# Patient Record
Sex: FEMALE | Race: ASIAN | NOT HISPANIC OR LATINO | Employment: UNEMPLOYED | URBAN - METROPOLITAN AREA
[De-identification: names, ages, dates, MRNs, and addresses within clinical notes are randomized per-mention and may not be internally consistent; named-entity substitution may affect disease eponyms.]

---

## 2019-04-09 PROBLEM — L68.0 HIRSUTISM: Status: ACTIVE | Noted: 2019-04-09

## 2019-04-09 PROBLEM — J01.00 ACUTE NON-RECURRENT MAXILLARY SINUSITIS: Status: ACTIVE | Noted: 2019-04-09

## 2019-10-21 PROBLEM — M26.621 TENDERNESS OF RIGHT TEMPOROMANDIBULAR JOINT: Status: ACTIVE | Noted: 2019-10-21

## 2019-10-21 PROBLEM — H92.01 RIGHT EAR PAIN: Status: ACTIVE | Noted: 2019-10-21

## 2019-10-21 PROBLEM — H92.01 RIGHT EAR PAIN: Status: ACTIVE | Noted: 2019-10-18

## 2020-05-11 PROBLEM — Z00.00 ROUTINE MEDICAL EXAM: Status: ACTIVE | Noted: 2020-05-11

## 2020-05-11 PROBLEM — Z00.01 ENCOUNTER FOR GENERAL ADULT MEDICAL EXAMINATION WITH ABNORMAL FINDINGS: Status: ACTIVE | Noted: 2020-05-11

## 2021-07-09 PROBLEM — E78.2 MIXED HYPERLIPIDEMIA: Status: ACTIVE | Noted: 2021-07-09

## 2021-07-09 PROBLEM — Z00.00 ROUTINE MEDICAL EXAM: Status: RESOLVED | Noted: 2020-05-11 | Resolved: 2021-07-09

## 2021-07-09 PROBLEM — J01.00 ACUTE NON-RECURRENT MAXILLARY SINUSITIS: Status: RESOLVED | Noted: 2019-04-09 | Resolved: 2021-07-09

## 2022-09-13 ENCOUNTER — OFFICE VISIT (OUTPATIENT)
Dept: FAMILY MEDICINE CLINIC | Facility: CLINIC | Age: 42
End: 2022-09-13
Payer: COMMERCIAL

## 2022-09-13 VITALS
WEIGHT: 139 LBS | DIASTOLIC BLOOD PRESSURE: 60 MMHG | HEIGHT: 62 IN | SYSTOLIC BLOOD PRESSURE: 100 MMHG | HEART RATE: 84 BPM | BODY MASS INDEX: 25.58 KG/M2 | OXYGEN SATURATION: 98 % | TEMPERATURE: 98.8 F

## 2022-09-13 DIAGNOSIS — E78.2 MIXED HYPERLIPIDEMIA: ICD-10-CM

## 2022-09-13 DIAGNOSIS — N89.8 VAGINAL ITCHING: ICD-10-CM

## 2022-09-13 DIAGNOSIS — E55.9 VITAMIN D DEFICIENCY: ICD-10-CM

## 2022-09-13 DIAGNOSIS — E66.3 OVERWEIGHT WITH BODY MASS INDEX (BMI) OF 25 TO 25.9 IN ADULT: ICD-10-CM

## 2022-09-13 DIAGNOSIS — Z12.31 ENCOUNTER FOR SCREENING MAMMOGRAM FOR BREAST CANCER: ICD-10-CM

## 2022-09-13 DIAGNOSIS — Z00.01 ENCOUNTER FOR WELL ADULT EXAM WITH ABNORMAL FINDINGS: Primary | ICD-10-CM

## 2022-09-13 LAB
BV WHIFF TEST VAG QL: NORMAL
CLUE CELLS SPEC QL WET PREP: NORMAL
PH SMN: NORMAL [PH]
SL AMB POCT WET MOUNT: NEGATIVE
T VAGINALIS VAG QL WET PREP: NORMAL
YEAST VAG QL WET PREP: NEGATIVE

## 2022-09-13 PROCEDURE — 99396 PREV VISIT EST AGE 40-64: CPT | Performed by: FAMILY MEDICINE

## 2022-09-13 PROCEDURE — 3725F SCREEN DEPRESSION PERFORMED: CPT | Performed by: FAMILY MEDICINE

## 2022-09-13 PROCEDURE — 99214 OFFICE O/P EST MOD 30 MIN: CPT | Performed by: FAMILY MEDICINE

## 2022-09-13 PROCEDURE — 87210 SMEAR WET MOUNT SALINE/INK: CPT | Performed by: FAMILY MEDICINE

## 2022-09-13 NOTE — PROGRESS NOTES
ADULT ANNUAL [address] [facility] PRIMARY CARE [facility]    NAME: Wyatt Carmen  AGE: 36 y o  SEX: female  : 1980     DATE: 2022     Assessment and Plan:     Problem List Items Addressed This Visit        Musculoskeletal and Integument    Vaginal itching     A new diagnosis symptomatic vaginal history exam no discharge wet mount negative the an no yeast infection result discussed the patient most probably irritation secondary to change in the brand the of the pad she use at the time of the menstruation recommend to use and scented 1 and use cotton underwear and the avoid the tight clothes         Relevant Orders    POCT wet mount (Completed)       Other    Vitamin D deficiency     Chronic in the vitamin-D compared with before continue vitamin-D supplement         Encounter for well adult exam with abnormal findings - Primary     Advice and education were given regarding nutrition, aerobic exercises, weight bearing exercises, cardiovascular risk reduction, fall risk reduction, and age appropriate supplements  The patient was counseled regarding instructions for management, risk factor reductions, prognosis, risks and benefits of treatment options, patient and family education, and importance of compliance with treatment       The patient is due for mammogram in  will schedule         Mixed hyperlipidemia     A chronic asymptomatic uncontrolled a 1 compare lipid panel to previously increase compared with before encouraged patient to watch for the portion low-fat diet and increased physical activity         Relevant Orders    Lipid Panel with Direct LDL reflex    Overweight with body mass index (BMI) of 25 to 25 9 in adult     The BMI today 25 63 discussed the patient portion control low carb low-fat diet and increased physical activity           Other Visit Diagnoses     Encounter for screening mammogram for breast cancer        Relevant Orders    Mammo screening bilateral w 3d & cad          Immunizations and preventive care screenings were discussed with patient today  Appropriate education was printed on patient's after visit summary  Counseling:  Alcohol/drug use: discussed moderation in alcohol intake, the recommendations for healthy alcohol use, and avoidance of illicit drug use  Dental Health: discussed importance of regular tooth brushing, flossing, and dental visits  Injury prevention: discussed safety/seat belts, safety helmets, smoke detectors, carbon dioxide detectors, and smoking near bedding or upholstery  Sexual health: discussed sexually transmitted diseases, partner selection, use of condoms, avoidance of unintended pregnancy, and contraceptive alternatives  Exercise: the importance of regular exercise/physical activity was discussed  Recommend exercise 3-5 times per week for at least 30 minutes  Depression Screening and Follow-up Plan: Patient was screened for depression during today's encounter  They screened negative with a PHQ-2 score of 0  Return in about 1 year (around 9/13/2023)  Chief Complaint:     Chief Complaint   Patient presents with    Physical Exam      History of Present Illness:     Adult Annual Physical   Patient here for a comprehensive physical exam  The patient reports problems - Vaginal itchy  Diet and Physical Activity  Diet/Nutrition: No special diet  Exercise: no formal exercise  Depression Screening  PHQ-2/9 Depression Screening    Little interest or pleasure in doing things: 0 - not at all  Feeling down, depressed, or hopeless: 0 - not at all  PHQ-2 Score: 0  PHQ-2 Interpretation: Negative depression screen       General Health  Sleep: sleeps well  Hearing: normal - bilateral   Vision: wears glasses  Dental: regular dental visits  /GYN Health  Patient is: premenopausal  Last menstrual period: 09/06/22  Contraceptive method: barrier methods       Review of Systems:     Review of Systems   Constitutional: Negative for chills and fever  HENT: Negative for ear pain and sore throat  Eyes: Negative for pain and visual disturbance  Respiratory: Negative for cough and shortness of breath  Cardiovascular: Negative for chest pain and palpitations  Gastrointestinal: Negative for abdominal pain and vomiting  Genitourinary: Negative for dysuria and hematuria  Vaginal itchy   Musculoskeletal: Negative for arthralgias and back pain  Skin: Negative for color change and rash  Neurological: Negative for seizures and syncope  All other systems reviewed and are negative       Past Medical History:     Past Medical History:   Diagnosis Date    Acute non-recurrent maxillary sinusitis 2019    Allergic rhinitis 2015    Anxiety 2017    Right ear pain 10/18/2019    Routine medical exam 2020      Past Surgical History:     Past Surgical History:   Procedure Laterality Date     SECTION        Social History:     Social History     Socioeconomic History    Marital status: /Civil Union     Spouse name: None    Number of children: None    Years of education: None    Highest education level: None   Occupational History    None   Tobacco Use    Smoking status: Never Smoker    Smokeless tobacco: Never Used   Vaping Use    Vaping Use: Never used   Substance and Sexual Activity    Alcohol use: Never    Drug use: No    Sexual activity: Yes     Partners: Male     Birth control/protection: Condom Male   Other Topics Concern    None   Social History Narrative    Fall risk: N    Activity level: Sedentary    Sleep changes: N    Animals: N    Firearms: N    Safety Belt use: Y     Social Determinants of Health     Financial Resource Strain: Not on file   Food Insecurity: Not on file   Transportation Needs: Not on file   Physical Activity: Not on file   Stress: Not on file   Social Connections: Not on file   Intimate Partner Violence: Not on file   Housing Stability: Not on file      Family History:     Family History   Problem Relation Age of Onset    Colon cancer Sister 21    Hypertension Mother     Other Sister         AUB, had hysterectomy    Breast cancer Sister 40    No Known Problems Sister     No Known Problems Daughter     No Known Problems Son       Current Medications:     Current Outpatient Medications   Medication Sig Dispense Refill    Cholecalciferol (VITAMIN D3) 2000 units capsule 4,000 Units        No current facility-administered medications for this visit  Allergies:     No Known Allergies   Physical Exam:     /60   Pulse 84   Temp 98 8 °F (37 1 °C) (Tympanic)   Ht 5' 1 75" (1 568 m)   Wt 63 kg (139 lb)   LMP 09/06/2022 (Exact Date)   SpO2 98%   Breastfeeding No   BMI 25 63 kg/m²     Physical Exam  Vitals and nursing note reviewed  Constitutional:       General: She is not in acute distress  Appearance: She is well-developed and normal weight  She is not toxic-appearing or diaphoretic  HENT:      Head: Normocephalic and atraumatic  Right Ear: Tympanic membrane, ear canal and external ear normal  There is no impacted cerumen  Left Ear: Tympanic membrane, ear canal and external ear normal  There is no impacted cerumen  Nose: Nose normal  No congestion or rhinorrhea  Mouth/Throat:      Mouth: Mucous membranes are moist       Pharynx: Oropharynx is clear  No oropharyngeal exudate or posterior oropharyngeal erythema  Eyes:      General: No scleral icterus  Right eye: No discharge  Left eye: No discharge  Conjunctiva/sclera: Conjunctivae normal       Pupils: Pupils are equal, round, and reactive to light  Neck:      Thyroid: No thyromegaly  Cardiovascular:      Rate and Rhythm: Normal rate and regular rhythm  Pulses: Normal pulses  Heart sounds: Normal heart sounds  No murmur heard  No friction rub     Pulmonary:      Effort: Pulmonary effort is normal  No respiratory distress  Breath sounds: Normal breath sounds  No wheezing or rales  Chest:      Chest wall: No tenderness  Abdominal:      General: There is no distension  Palpations: Abdomen is soft  There is no mass  Tenderness: There is no abdominal tenderness  Hernia: No hernia is present  There is no hernia in the left inguinal area  Genitourinary:     General: Normal vulva  Labia:         Right: No rash  Left: No rash  Vagina: No foreign body  No vaginal discharge, tenderness or bleeding  Cervix: No cervical motion tenderness  Adnexa:         Right: No mass or tenderness  Left: No mass or tenderness  Musculoskeletal:         General: Normal range of motion  Cervical back: Normal range of motion  No rigidity  Lymphadenopathy:      Cervical: No cervical adenopathy  Skin:     General: Skin is warm  Coloration: Skin is not pale  Findings: No erythema or rash  Neurological:      Mental Status: She is alert and oriented to person, place, and time  Sensory: No sensory deficit  Motor: No weakness or abnormal muscle tone        Gait: Gait normal       Deep Tendon Reflexes: Reflexes normal    Psychiatric:         Mood and Affect: Mood normal          Behavior: Behavior normal           Estephania Lyn MD  [address] [place] [address]

## 2022-09-13 NOTE — ASSESSMENT & PLAN NOTE
The BMI today 25 63 discussed the patient portion control low carb low-fat diet and increased physical activity

## 2022-09-13 NOTE — ASSESSMENT & PLAN NOTE
Advice and education were given regarding nutrition, aerobic exercises, weight bearing exercises, cardiovascular risk reduction, fall risk reduction, and age appropriate supplements  The patient was counseled regarding instructions for management, risk factor reductions, prognosis, risks and benefits of treatment options, patient and family education, and importance of compliance with treatment       The patient is due for mammogram in December 22 will schedule

## 2022-09-13 NOTE — PROGRESS NOTES
BMI Counseling: Body mass index is 25 63 kg/m²  The BMI is above normal  Nutrition recommendations include decreasing portion sizes, decreasing fast food intake and limiting drinks that contain sugar  Exercise recommendations include exercising 3-5 times per week  No pharmacotherapy was ordered  Rationale for BMI follow-up plan is due to patient being overweight or obese  Subjective:   Chief Complaint   Patient presents with    Physical Exam        Patient ID: Lorri Lopez is a 36 y o  female  Patient here for well exam follow-up with a chronic condition she concerned about itchy in the vaginal area she had the on and off since her last menstruation deny any vaginal discharge no order she sometimes does not wear cotton underwear she wear tight clothes no abdomen pain no flank pain no the vaginal bleeding patient history of hyperlipidemia vitamin-D deficiency she try to monitor the diet the recent blood work review with the patient      The following portions of the patient's history were reviewed and updated as appropriate: allergies, current medications, past family history, past medical history, past social history, past surgical history and problem list     Review of Systems   Constitutional: Negative for activity change, appetite change, chills, fatigue and fever  HENT: Negative for congestion, ear pain, sinus pressure, sinus pain and sore throat  Eyes: Negative for pain, discharge, redness, itching and visual disturbance  Respiratory: Negative for cough, chest tightness, shortness of breath and stridor  Cardiovascular: Negative for chest pain, palpitations and leg swelling  Gastrointestinal: Negative for abdominal pain, blood in stool, constipation, diarrhea, nausea and vomiting  Genitourinary: Negative for dysuria, flank pain, frequency, hematuria, vaginal discharge and vaginal pain          Vaginal itchy   Musculoskeletal: Negative for arthralgias, back pain, joint swelling and neck pain    Skin: Negative for color change, pallor and rash  Neurological: Negative for dizziness, tremors, seizures, syncope, weakness, numbness and headaches  Hematological: Does not bruise/bleed easily  All other systems reviewed and are negative  Objective:  Vitals:    09/13/22 1125   BP: 100/60   Pulse: 84   Temp: 98 8 °F (37 1 °C)   TempSrc: Tympanic   SpO2: 98%   Weight: 63 kg (139 lb)   Height: 5' 1 75" (1 568 m)      Physical Exam  Vitals and nursing note reviewed  Constitutional:       General: She is not in acute distress  Appearance: She is well-developed  She is not diaphoretic  HENT:      Head: Normocephalic and atraumatic  Right Ear: Tympanic membrane, ear canal and external ear normal       Left Ear: Tympanic membrane, ear canal and external ear normal       Nose: Nose normal  No congestion or rhinorrhea  Mouth/Throat:      Mouth: Mucous membranes are moist       Pharynx: Oropharynx is clear  No oropharyngeal exudate or posterior oropharyngeal erythema  Eyes:      General:         Right eye: No discharge  Left eye: No discharge  Conjunctiva/sclera: Conjunctivae normal       Pupils: Pupils are equal, round, and reactive to light  Neck:      Vascular: No JVD  Cardiovascular:      Rate and Rhythm: Normal rate and regular rhythm  Heart sounds: Normal heart sounds  No murmur heard  No friction rub  No gallop  Pulmonary:      Effort: Pulmonary effort is normal  No respiratory distress  Breath sounds: Normal breath sounds  No stridor  No wheezing or rales  Chest:      Chest wall: No tenderness  Abdominal:      General: Bowel sounds are normal  There is no distension  Palpations: Abdomen is soft  There is no mass  Tenderness: There is no abdominal tenderness  There is no rebound  Genitourinary:     General: Normal vulva  Vagina: No vaginal discharge  Musculoskeletal:         General: No tenderness        Cervical back: Normal range of motion and neck supple  Lymphadenopathy:      Cervical: No cervical adenopathy  Skin:     General: Skin is warm  Findings: No erythema or rash  Neurological:      Mental Status: She is alert and oriented to person, place, and time  Motor: No weakness  Gait: Gait normal    Psychiatric:         Mood and Affect: Mood normal            Assessment/Plan:    Encounter for well adult exam with abnormal findings  Advice and education were given regarding nutrition, aerobic exercises, weight bearing exercises, cardiovascular risk reduction, fall risk reduction, and age appropriate supplements  The patient was counseled regarding instructions for management, risk factor reductions, prognosis, risks and benefits of treatment options, patient and family education, and importance of compliance with treatment       The patient is due for mammogram in December 22 will schedule    Overweight with body mass index (BMI) of 25 to 25 9 in adult  The BMI today 25 63 discussed the patient portion control low carb low-fat diet and increased physical activity    Vaginal itching  A new diagnosis symptomatic vaginal history exam no discharge wet mount negative the an no yeast infection result discussed the patient most probably irritation secondary to change in the brand the of the pad she use at the time of the menstruation recommend to use and scented 1 and use cotton underwear and the avoid the tight clothes    Vitamin D deficiency  Chronic in the vitamin-D compared with before continue vitamin-D supplement    Mixed hyperlipidemia  A chronic asymptomatic uncontrolled a 1 compare lipid panel to previously increase compared with before encouraged patient to watch for the portion low-fat diet and increased physical activity       Diagnoses and all orders for this visit:    Encounter for well adult exam with abnormal findings    Overweight with body mass index (BMI) of 25 to 25 9 in adult    Vitamin D deficiency    Mixed hyperlipidemia  -     Lipid Panel with Direct LDL reflex;  Future    Vaginal itching  -     POCT wet mount    Encounter for screening mammogram for breast cancer  -     Mammo screening bilateral w 3d & cad; Future

## 2022-09-13 NOTE — ASSESSMENT & PLAN NOTE
A new diagnosis symptomatic vaginal history exam no discharge wet mount negative the an no yeast infection result discussed the patient most probably irritation secondary to change in the brand the of the pad she use at the time of the menstruation recommend to use and scented 1 and use cotton underwear and the avoid the tight clothes

## 2022-09-13 NOTE — PATIENT INSTRUCTIONS

## 2022-12-13 DIAGNOSIS — Z12.31 ENCOUNTER FOR SCREENING MAMMOGRAM FOR BREAST CANCER: ICD-10-CM

## 2022-12-16 ENCOUNTER — TELEPHONE (OUTPATIENT)
Dept: FAMILY MEDICINE CLINIC | Facility: CLINIC | Age: 42
End: 2022-12-16

## 2022-12-16 NOTE — TELEPHONE ENCOUNTER
----- Message from Jacobo Guzman MD sent at 12/13/2022  8:31 PM EST -----  Negative mammogram repeat in 1 year

## 2023-03-17 ENCOUNTER — OFFICE VISIT (OUTPATIENT)
Dept: FAMILY MEDICINE CLINIC | Facility: CLINIC | Age: 43
End: 2023-03-17

## 2023-03-17 VITALS
TEMPERATURE: 99.5 F | DIASTOLIC BLOOD PRESSURE: 70 MMHG | BODY MASS INDEX: 26.68 KG/M2 | WEIGHT: 145 LBS | OXYGEN SATURATION: 99 % | HEART RATE: 85 BPM | HEIGHT: 62 IN | SYSTOLIC BLOOD PRESSURE: 110 MMHG

## 2023-03-17 DIAGNOSIS — E66.3 OVERWEIGHT WITH BODY MASS INDEX (BMI) OF 25 TO 25.9 IN ADULT: ICD-10-CM

## 2023-03-17 DIAGNOSIS — E78.2 MIXED HYPERLIPIDEMIA: Primary | ICD-10-CM

## 2023-03-17 DIAGNOSIS — E55.9 VITAMIN D DEFICIENCY: ICD-10-CM

## 2023-03-17 RX ORDER — ATORVASTATIN CALCIUM 10 MG/1
10 TABLET, FILM COATED ORAL DAILY
Qty: 90 TABLET | Refills: 1 | Status: SHIPPED | OUTPATIENT
Start: 2023-03-17

## 2023-03-17 NOTE — ASSESSMENT & PLAN NOTE
Chronic uncontrol increase in Lipid panel compare with before  despite the patient has been watching for her low fiber diet she has family history of hyperlipidemia  recommended the patient to start atorvastatin 10 mg once a day upper use and possible side effects reviewed

## 2023-03-17 NOTE — ASSESSMENT & PLAN NOTE
Increase in the BMI compared to before BMI today 26 5 discussed with patient portion control low-carb low-fat diet and increase physical activity

## 2023-03-17 NOTE — PROGRESS NOTES
Name: Monica Bahena      : 1980      MRN: [id number]  Encounter Provider: Rashel Solano MD  Encounter Date: 3/17/2023   Encounter department: [place] [address]  Mixed hyperlipidemia  Assessment & Plan:  Chronic uncontrol increase in Lipid panel compare with before  despite the patient has been watching for her low fiber diet she has family history of hyperlipidemia  recommended the patient to start atorvastatin 10 mg once a day upper use and possible side effects reviewed        Orders:  -     atorvastatin (LIPITOR) 10 mg tablet; Take 1 tablet (10 mg total) by mouth daily  -     Lipid Panel with Direct LDL reflex; Future    2  Overweight with body mass index (BMI) of 25 to 25 9 in adult  Assessment & Plan:  Increase in the BMI compared to before BMI today 26 5 discussed with patient portion control low-carb low-fat diet and increase physical activity    Orders:  -     Lipid Panel with Direct LDL reflex; Future  -     TSH, 3rd generation with Free T4 reflex; Future  -     ALT; Future  -     AST; Future    3  Vitamin D deficiency  Assessment & Plan:  Chronic asymptomatic continue vitamin D supplement vitamin D rich diet reviewed with the patient        BMI Counseling: Body mass index is 26 52 kg/m²  The BMI is above normal  Nutrition recommendations include decreasing portion sizes, decreasing fast food intake and limiting drinks that contain sugar  Exercise recommendations include exercising 3-5 times per week  No pharmacotherapy was ordered  Rationale for BMI follow-up plan is due to patient being overweight or obese  Depression Screening and Follow-up Plan: Patient was screened for depression during today's encounter  They screened negative with a PHQ-2 score of 0          Subjective      Patient here for follow-up of the chronic condition patient has a history of hyperlipidemia she been trying to control it with the diet to she been watching for the red meat and fried food but recent blood work showing increase in her lipid panel compared with before    Review of Systems   Constitutional: Negative for chills and fever  HENT: Negative for ear pain and sore throat  Eyes: Negative for pain and visual disturbance  Respiratory: Negative for cough and shortness of breath  Cardiovascular: Negative for chest pain and palpitations  Gastrointestinal: Negative for abdominal pain, constipation, diarrhea, nausea and vomiting  Genitourinary: Negative for dysuria and hematuria  Musculoskeletal: Negative for arthralgias and back pain  Skin: Negative for color change and rash  Neurological: Negative for seizures and syncope  All other systems reviewed and are negative  Current Outpatient Medications on File Prior to Visit   Medication Sig   • Cholecalciferol (VITAMIN D3) 2000 units capsule 4,000 Units        Objective     /70 (BP Location: Left arm, Patient Position: Sitting)   Pulse 85   Temp 99 5 °F (37 5 °C) (Tympanic)   Ht 5' 2" (1 575 m)   Wt 65 8 kg (145 lb)   LMP 02/28/2023 (Exact Date)   SpO2 99%   Breastfeeding No   BMI 26 52 kg/m²     Physical Exam  Vitals and nursing note reviewed  Constitutional:       General: She is not in acute distress  Appearance: She is well-developed  She is not diaphoretic  HENT:      Head: Normocephalic  Right Ear: External ear normal       Left Ear: External ear normal       Nose: No congestion  Mouth/Throat:      Pharynx: No oropharyngeal exudate  Eyes:      General:         Right eye: No discharge  Left eye: No discharge  Conjunctiva/sclera: Conjunctivae normal    Neck:      Vascular: No JVD  Cardiovascular:      Rate and Rhythm: Normal rate and regular rhythm  Heart sounds: Normal heart sounds  No murmur heard  No gallop  Pulmonary:      Effort: Pulmonary effort is normal  No respiratory distress  Breath sounds: Normal breath sounds   No stridor  No wheezing or rales  Chest:      Chest wall: No tenderness  Abdominal:      General: There is no distension  Palpations: Abdomen is soft  There is no mass  Tenderness: There is no abdominal tenderness  There is no rebound  Musculoskeletal:         General: No tenderness  Cervical back: Normal range of motion and neck supple  Lymphadenopathy:      Cervical: No cervical adenopathy  Skin:     General: Skin is warm  Findings: No erythema or rash  Neurological:      Mental Status: She is alert and oriented to person, place, and time         Ronnell Valiente MD

## 2023-05-23 DIAGNOSIS — E78.2 MIXED HYPERLIPIDEMIA: ICD-10-CM

## 2023-05-23 RX ORDER — ATORVASTATIN CALCIUM 10 MG/1
10 TABLET, FILM COATED ORAL DAILY
Qty: 90 TABLET | Refills: 0 | Status: SHIPPED | OUTPATIENT
Start: 2023-05-23

## 2023-08-18 DIAGNOSIS — E78.2 MIXED HYPERLIPIDEMIA: ICD-10-CM

## 2023-08-18 RX ORDER — ATORVASTATIN CALCIUM 10 MG/1
10 TABLET, FILM COATED ORAL DAILY
Qty: 90 TABLET | Refills: 0 | Status: SHIPPED | OUTPATIENT
Start: 2023-08-18

## 2023-09-08 LAB
ALT SERPL-CCNC: 16 U/L (ref 6–29)
AST SERPL-CCNC: 15 U/L (ref 10–30)
CHOLEST SERPL-MCNC: 148 MG/DL
CHOLEST/HDLC SERPL: 3.1 (CALC)
HDLC SERPL-MCNC: 48 MG/DL
LDLC SERPL CALC-MCNC: 79 MG/DL (CALC)
NONHDLC SERPL-MCNC: 100 MG/DL (CALC)
TRIGL SERPL-MCNC: 113 MG/DL
TSH SERPL-ACNC: 2.81 MIU/L

## 2023-09-13 ENCOUNTER — OFFICE VISIT (OUTPATIENT)
Dept: FAMILY MEDICINE CLINIC | Facility: CLINIC | Age: 43
End: 2023-09-13
Payer: COMMERCIAL

## 2023-09-13 VITALS
BODY MASS INDEX: 26.39 KG/M2 | WEIGHT: 143.4 LBS | SYSTOLIC BLOOD PRESSURE: 110 MMHG | DIASTOLIC BLOOD PRESSURE: 72 MMHG | HEART RATE: 77 BPM | HEIGHT: 62 IN | OXYGEN SATURATION: 99 % | TEMPERATURE: 96.6 F

## 2023-09-13 DIAGNOSIS — E78.2 MIXED HYPERLIPIDEMIA: ICD-10-CM

## 2023-09-13 DIAGNOSIS — E66.3 OVERWEIGHT (BMI 25.0-29.9): ICD-10-CM

## 2023-09-13 DIAGNOSIS — E55.9 VITAMIN D DEFICIENCY: ICD-10-CM

## 2023-09-13 DIAGNOSIS — Z00.01 ENCOUNTER FOR WELL ADULT EXAM WITH ABNORMAL FINDINGS: Primary | ICD-10-CM

## 2023-09-13 DIAGNOSIS — Z28.21 IMMUNIZATION REFUSED: ICD-10-CM

## 2023-09-13 PROCEDURE — 99214 OFFICE O/P EST MOD 30 MIN: CPT | Performed by: FAMILY MEDICINE

## 2023-09-13 PROCEDURE — 99396 PREV VISIT EST AGE 40-64: CPT | Performed by: FAMILY MEDICINE

## 2023-09-13 NOTE — ASSESSMENT & PLAN NOTE
Increase in the BMI compared with before her BMI today 26.23 discussed with patient portion control low-carb low-fat diet increase physical activity

## 2023-09-13 NOTE — PROGRESS NOTES
ADULT ANNUAL [address] [facility] CARE [facility]    NAME: Wyatt Caremn  AGE: 39 y.o. SEX: female  : 1980     DATE: 2023     Assessment and Plan:     Problem List Items Addressed This Visit        Other    Vitamin D deficiency     Chronic asymptomatic encourage patient to take vitamin D supplements as she had tendency to forget to take medication         Relevant Orders    Vitamin D 25 hydroxy    Encounter for well adult exam with abnormal findings - Primary     Advice and education were given regarding nutrition, aerobic exercises, weight-bearing exercises, cardiovascular risk reduction, fall risk reduction, and age-appropriate supplements. The patient was counseled regarding instructions for management, risk factor reductions, prognosis, risks and benefits of treatment options, patient and family education, and importance of compliance with treatment. Mixed hyperlipidemia     Chronic asymptomatic improved in the lipid panel compared with before encourage patient to continue with atorvastatin 10 mg once a day          Relevant Orders    Lipid Panel with Direct LDL reflex    Overweight (BMI 25.0-29. 9)     Increase in the BMI compared with before her BMI today 26.23 discussed with patient portion control low-carb low-fat diet increase physical activity         Immunization refused     Patient declined flu shot for today            Immunizations and preventive care screenings were discussed with patient today. Appropriate education was printed on patient's after visit summary. Counseling:  Alcohol/drug use: discussed moderation in alcohol intake, the recommendations for healthy alcohol use, and avoidance of illicit drug use. Dental Health: discussed importance of regular tooth brushing, flossing, and dental visits.   Injury prevention: discussed safety/seat belts, safety helmets, smoke detectors, carbon dioxide detectors, and smoking near bedding or upholstery. Sexual health: discussed sexually transmitted diseases, partner selection, use of condoms, avoidance of unintended pregnancy, and contraceptive alternatives. Exercise: the importance of regular exercise/physical activity was discussed. Recommend exercise 3-5 times per week for at least 30 minutes. BMI Counseling: Body mass index is 26.23 kg/m². The BMI is above normal. Nutrition recommendations include decreasing portion sizes, decreasing fast food intake and limiting drinks that contain sugar. Exercise recommendations include exercising 3-5 times per week. No pharmacotherapy was ordered. Rationale for BMI follow-up plan is due to patient being overweight or obese. Depression Screening and Follow-up Plan: Patient was screened for depression during today's encounter. They screened negative with a PHQ-2 score of 0. Return in about 1 year (around 9/13/2024). Chief Complaint:     Chief Complaint   Patient presents with   • Physical Exam   • Follow-up     Blood work       History of Present Illness:     Adult Annual Physical   Patient here for a comprehensive physical exam. The patient reports Follow-up with a chronic condition patient history of hyperlipidemia on atorvastatin 10 mg tolerated well without side effect. She been compliant with well. She has not been taking her vitamin D on a daily basis no new concerns recent blood work reviewed with the patient. Diet and Physical Activity  Diet/Nutrition: No special diet. Exercise: 1-2 times a week on average. Depression Screening  PHQ-2/9 Depression Screening    Little interest or pleasure in doing things: 0 - not at all  Feeling down, depressed, or hopeless: 0 - not at all  PHQ-2 Score: 0  PHQ-2 Interpretation: Negative depression screen       General Health  Sleep: sleeps well. Hearing: normal - bilateral.  Vision: wears glasses. Dental: regular dental visits.        /GYN Health  Patient is: premenopausal  Last menstrual period: last saturday  Contraceptive method: barrier methods. Review of Systems:     Review of Systems   Constitutional: Negative for chills and fever. HENT: Negative for ear pain and sore throat. Eyes: Negative for pain and visual disturbance. Respiratory: Negative for cough and shortness of breath. Cardiovascular: Negative for chest pain and palpitations. Gastrointestinal: Negative for abdominal pain, constipation, diarrhea, nausea and vomiting. Genitourinary: Negative for dysuria and hematuria. Musculoskeletal: Negative for arthralgias and back pain. Skin: Negative for color change and rash. Neurological: Negative for seizures and syncope. Hematological: Does not bruise/bleed easily. Psychiatric/Behavioral: Negative for behavioral problems. All other systems reviewed and are negative.      Past Medical History:     Past Medical History:   Diagnosis Date   • Acute non-recurrent maxillary sinusitis 2019   • Allergic rhinitis 2015   • Anxiety 2017   • Right ear pain 10/18/2019   • Routine medical exam 2020      Past Surgical History:     Past Surgical History:   Procedure Laterality Date   •  SECTION        Social History:     Social History     Socioeconomic History   • Marital status: /Civil Union     Spouse name: None   • Number of children: None   • Years of education: None   • Highest education level: None   Occupational History   • None   Tobacco Use   • Smoking status: Never     Passive exposure: Never   • Smokeless tobacco: Never   Vaping Use   • Vaping Use: Never used   Substance and Sexual Activity   • Alcohol use: Never   • Drug use: No   • Sexual activity: Yes     Partners: Male     Birth control/protection: Condom Male   Other Topics Concern   • None   Social History Narrative    Fall risk: N    Activity level: Sedentary    Sleep changes: N    Animals: N    Firearms: N    Safety Belt use: Y     Social Determinants of Health     Financial Resource Strain: Low Risk  (7/9/2021)    Overall Financial Resource Strain (CARDIA)    • Difficulty of Paying Living Expenses: Not hard at all   Food Insecurity: No Food Insecurity (7/9/2021)    Hunger Vital Sign    • Worried About Running Out of Food in the Last Year: Never true    • Ran Out of Food in the Last Year: Never true   Transportation Needs: No Transportation Needs (7/9/2021)    PRAPARE - Transportation    • Lack of Transportation (Medical): No    • Lack of Transportation (Non-Medical): No   Physical Activity: Insufficiently Active (7/9/2021)    Exercise Vital Sign    • Days of Exercise per Week: 2 days    • Minutes of Exercise per Session: 20 min   Stress: No Stress Concern Present (7/9/2021)    [facility]    • Feeling of Stress : Not at all   Social Connections: Socially Integrated (7/9/2021)    Social Connection and Isolation Panel [NHANES]    • Frequency of Communication with Friends and Family: More than three times a week    • Frequency of Social Gatherings with Friends and Family: Once a week    • Attends [employer] Services: More than 4 times per year    • Active Member of Clubs or Organizations:  Yes    • Attends Club or Organization Meetings: More than 4 times per year    • Marital Status:    Intimate Partner Violence: Not At Risk (7/9/2021)    Humiliation, Afraid, Rape, and Kick questionnaire    • Fear of Current or Ex-Partner: No    • Emotionally Abused: No    • Physically Abused: No    • Sexually Abused: No   Housing Stability: Unknown (7/9/2021)    Housing Stability Vital Sign    • Unable to Pay for Housing in the Last Year: No    • Number of Places Lived in the Last Year: Not on file    • Unstable Housing in the Last Year: No      Family History:     Family History   Problem Relation Age of Onset   • Colon cancer Sister 21   • Hypertension Mother    • Other Sister         AUB, had hysterectomy   • Breast cancer Sister 40   • No Known Problems Sister    • No Known Problems Daughter    • No Known Problems Son       Current Medications:     Current Outpatient Medications   Medication Sig Dispense Refill   • atorvastatin (LIPITOR) 10 mg tablet TAKE 1 TABLET BY MOUTH EVERY DAY 90 tablet 0   • Cholecalciferol (VITAMIN D3) 2000 units capsule 4,000 Units        No current facility-administered medications for this visit. Allergies:     No Known Allergies   Physical Exam:     /72 (BP Location: Left arm, Patient Position: Sitting)   Pulse 77   Temp (!) 96.6 °F (35.9 °C) (Temporal)   Ht 5' 2" (1.575 m)   Wt 65 kg (143 lb 6.4 oz)   SpO2 99%   BMI 26.23 kg/m²     Physical Exam  Vitals and nursing note reviewed. Constitutional:       General: She is not in acute distress. Appearance: She is well-developed. HENT:      Head: Normocephalic and atraumatic. Right Ear: Tympanic membrane normal. There is no impacted cerumen. Left Ear: Tympanic membrane normal. There is no impacted cerumen. Nose: No rhinorrhea. Mouth/Throat:      Pharynx: No posterior oropharyngeal erythema. Eyes:      General:         Right eye: No discharge. Left eye: No discharge. Conjunctiva/sclera: Conjunctivae normal.   Cardiovascular:      Rate and Rhythm: Normal rate and regular rhythm. Heart sounds: No murmur heard. Pulmonary:      Effort: Pulmonary effort is normal. No respiratory distress. Breath sounds: Normal breath sounds. Abdominal:      Palpations: Abdomen is soft. Tenderness: There is no abdominal tenderness. Musculoskeletal:         General: No swelling. Cervical back: Neck supple. Skin:     General: Skin is warm and dry. Capillary Refill: Capillary refill takes less than 2 seconds. Neurological:      Mental Status: She is alert and oriented to person, place, and time.    Psychiatric:         Mood and Affect: Mood normal.          [facility] Juan Diamond MD  [address]

## 2023-09-13 NOTE — ASSESSMENT & PLAN NOTE
Chronic asymptomatic improved in the lipid panel compared with before encourage patient to continue with atorvastatin 10 mg once a day Problem: Adult Mental Health  Goal: Reports or exhibits reduction in symptoms associated with elevated or labile mood/josy  Outcome: Outcome Met, Complete Goal  Goal: Reports or exhibits improvement in depressive mood signs/symptoms  Outcome: Outcome Met, Complete Goal  Goal: Reports or exhibits an improvement in mood signs/symptoms associated with anxiety  Outcome: Outcome Met, Complete Goal  Goal: Demonstrates ability to proactively perform self cares  Outcome: Outcome Met, Complete Goal  Goal: Demonstrates the ability to engage in reality-based communication and refrains from acting on delusional thoughts  Outcome: Outcome Met, Complete Goal  Goal: Reports or exhibits hallucinations absent or at manageable level  Outcome: Outcome Met, Complete Goal  Goal: Demonstrates improved ability to track conversation, process information  Outcome: Outcome Met, Complete Goal  Goal: Reports decrease in thoughts of suicide  Outcome: Outcome Met, Complete Goal  Goal: Reports decrease in thoughts of violence  Outcome: Outcome Met, Complete Goal  Goal: Reports decrease in thoughts of self harm  Outcome: Outcome Met, Complete Goal  Goal: Verbalizes when symptoms of illness are triggered and reports to staff when experiencing urges/intent of suicide  Outcome: Outcome Met, Complete Goal  Goal: Verbalizes when symptoms of illness are triggered and reports to staff when experiencing urges/intent of violence  Outcome: Outcome Met, Complete Goal  Goal: Verbalizes when symptoms of illness are triggered and reports to staff when experiencing urges/intent of self harm  Outcome: Outcome Met, Complete Goal  Goal: Denies having a suicidal plan  Outcome: Outcome Met, Complete Goal  Goal: Denies having a homicidal plan  Outcome: Outcome Met, Complete Goal  Goal: Verbalizes understanding of positive individual coping skills  Outcome: Outcome Met, Complete Goal  Goal: Demonstrates control of behavior, refraining from hostility, aggression or  threats of violence  Outcome: Outcome Met, Complete Goal  Goal: Refrains from self harm behavior/cutting  Outcome: Outcome Met, Complete Goal  Goal: Verbalizes understanding of diagnosis, reason for treament and treatment program  Outcome: Outcome Met, Complete Goal  Goal: Verbalizes understanding of medication management/monitoring/assessment of effectiveness  Outcome: Outcome Met, Complete Goal  Goal: Demonstrates or reports decrease in symptoms of paranoia or delusional thoughts  Outcome: Outcome Met, Complete Goal

## 2023-09-13 NOTE — ASSESSMENT & PLAN NOTE
Chronic asymptomatic encourage patient to take vitamin D supplements as she had tendency to forget to take medication

## 2023-11-12 DIAGNOSIS — E78.2 MIXED HYPERLIPIDEMIA: ICD-10-CM

## 2023-11-13 RX ORDER — ATORVASTATIN CALCIUM 10 MG/1
10 TABLET, FILM COATED ORAL DAILY
Qty: 90 TABLET | Refills: 0 | Status: SHIPPED | OUTPATIENT
Start: 2023-11-13

## 2024-02-13 DIAGNOSIS — E78.2 MIXED HYPERLIPIDEMIA: ICD-10-CM

## 2024-02-13 RX ORDER — ATORVASTATIN CALCIUM 10 MG/1
10 TABLET, FILM COATED ORAL DAILY
Qty: 90 TABLET | Refills: 1 | Status: SHIPPED | OUTPATIENT
Start: 2024-02-13

## 2024-03-08 ENCOUNTER — OFFICE VISIT (OUTPATIENT)
Dept: FAMILY MEDICINE CLINIC | Facility: CLINIC | Age: 44
End: 2024-03-08
Payer: COMMERCIAL

## 2024-03-08 VITALS
WEIGHT: 142.2 LBS | HEART RATE: 100 BPM | HEIGHT: 62 IN | SYSTOLIC BLOOD PRESSURE: 112 MMHG | DIASTOLIC BLOOD PRESSURE: 80 MMHG | OXYGEN SATURATION: 99 % | TEMPERATURE: 99.4 F | BODY MASS INDEX: 26.17 KG/M2

## 2024-03-08 DIAGNOSIS — Z13.29 SCREENING FOR THYROID DISORDER: ICD-10-CM

## 2024-03-08 DIAGNOSIS — E78.2 MIXED HYPERLIPIDEMIA: Primary | ICD-10-CM

## 2024-03-08 DIAGNOSIS — E55.9 VITAMIN D DEFICIENCY: ICD-10-CM

## 2024-03-08 DIAGNOSIS — E66.3 OVERWEIGHT (BMI 25.0-29.9): ICD-10-CM

## 2024-03-08 PROCEDURE — 99214 OFFICE O/P EST MOD 30 MIN: CPT | Performed by: FAMILY MEDICINE

## 2024-03-08 NOTE — PROGRESS NOTES
Name: Wyatt Carmen      : 1980      MRN: [id number]  Encounter Provider: Jennyfer Garcia MD  Encounter Date: 3/8/2024   Encounter department: [facility]    Assessment & Plan     1. Mixed hyperlipidemia  Assessment & Plan:  Chronic asymptomatic fair control continue with atorvastatin 10 mg once a day low-fat diet discussed with the patient    Orders:  -     CBC and differential; Future  -     Comprehensive metabolic panel; Future  -     Lipid Panel with Direct LDL reflex; Future    2. Vitamin D deficiency  Assessment & Plan:  Chronic asymptomatic continue vitamin D supplement vitamin D rich diet discussed with patient    Orders:  -     CBC and differential; Future  -     Comprehensive metabolic panel; Future  -     Vitamin D 25 hydroxy; Future    3. Overweight (BMI 25.0-29.9)  -     CBC and differential; Future  -     Comprehensive metabolic panel; Future    4. Screening for thyroid disorder  -     TSH, 3rd generation with Free T4 reflex; Future         Subjective      Patient here follow-up with a chronic condition compliant with the medication tolerated well without side effect no new concerns recent blood work discussed with the patient      Review of Systems   Constitutional:  Negative for chills and fever.   HENT:  Negative for ear pain and sore throat.    Eyes:  Negative for pain and visual disturbance.   Respiratory:  Negative for cough and shortness of breath.    Cardiovascular:  Negative for chest pain and palpitations.   Gastrointestinal:  Negative for abdominal pain, constipation, diarrhea and vomiting.   Genitourinary:  Negative for dysuria and hematuria.   Musculoskeletal:  Negative for arthralgias and back pain.   Skin:  Negative for color change and rash.   Neurological:  Negative for seizures and syncope.   All other systems reviewed and are negative.      Current Outpatient Medications on File Prior to Visit   Medication Sig   • atorvastatin (LIPITOR) 10 mg  "tablet TAKE 1 TABLET BY MOUTH EVERY DAY   • Cholecalciferol (VITAMIN D3) 2000 units capsule 4,000 Units  (Patient not taking: Reported on 3/8/2024)       Objective     /80 (BP Location: Left arm, Patient Position: Sitting)   Pulse 100   Temp 99.4 °F (37.4 °C) (Tympanic)   Ht 5' 2\" (1.575 m)   Wt 64.5 kg (142 lb 3.2 oz)   SpO2 99%   BMI 26.01 kg/m²     Physical Exam  Vitals and nursing note reviewed.   Constitutional:       General: She is not in acute distress.     Appearance: She is not diaphoretic.   HENT:      Head: Normocephalic and atraumatic.      Right Ear: Tympanic membrane normal. There is no impacted cerumen.      Left Ear: Tympanic membrane normal. There is no impacted cerumen.      Nose: Nose normal. No congestion or rhinorrhea.      Mouth/Throat:      Pharynx: No posterior oropharyngeal erythema.   Eyes:      General: No scleral icterus.        Right eye: No discharge.         Left eye: No discharge.   Cardiovascular:      Rate and Rhythm: Normal rate and regular rhythm.      Heart sounds: No murmur heard.  Pulmonary:      Effort: Pulmonary effort is normal. No respiratory distress.   Abdominal:      General: There is no distension.      Tenderness: There is no abdominal tenderness.   Musculoskeletal:         General: Normal range of motion.      Cervical back: Normal range of motion.      Right lower leg: No edema.      Left lower leg: No edema.   Skin:     Findings: No rash.   Neurological:      Mental Status: She is alert and oriented to person, place, and time.       Jennyfer Garcia MD    "

## 2024-03-08 NOTE — ASSESSMENT & PLAN NOTE
Chronic asymptomatic fair control continue with atorvastatin 10 mg once a day low-fat diet discussed with the patient

## 2024-03-11 ENCOUNTER — TELEPHONE (OUTPATIENT)
Dept: FAMILY MEDICINE CLINIC | Facility: CLINIC | Age: 44
End: 2024-03-11

## 2024-03-11 DIAGNOSIS — Z12.31 ENCOUNTER FOR SCREENING MAMMOGRAM FOR MALIGNANT NEOPLASM OF BREAST: Primary | ICD-10-CM

## 2024-03-11 NOTE — TELEPHONE ENCOUNTER
----- Message from Jennyfer Garcia MD sent at 3/10/2024  4:43 PM EDT -----  Mammogram is negative repeat in one year

## 2024-03-14 ENCOUNTER — TELEPHONE (OUTPATIENT)
Age: 44
End: 2024-03-14

## 2024-03-14 ENCOUNTER — OFFICE VISIT (OUTPATIENT)
Dept: OBGYN CLINIC | Facility: CLINIC | Age: 44
End: 2024-03-14
Payer: COMMERCIAL

## 2024-03-14 VITALS
DIASTOLIC BLOOD PRESSURE: 66 MMHG | BODY MASS INDEX: 26.61 KG/M2 | HEIGHT: 62 IN | WEIGHT: 144.6 LBS | SYSTOLIC BLOOD PRESSURE: 110 MMHG

## 2024-03-14 DIAGNOSIS — Z30.09 ENCOUNTER FOR OTHER GENERAL COUNSELING OR ADVICE ON CONTRACEPTION: Primary | ICD-10-CM

## 2024-03-14 PROCEDURE — 99215 OFFICE O/P EST HI 40 MIN: CPT | Performed by: OBSTETRICS & GYNECOLOGY

## 2024-03-14 NOTE — PROGRESS NOTES
"Subjective    Patient is a 42 yo  who presents today to discuss contraception. She does not desire anymore children, although her  may desire one more child. She desires a highly effective and long lasting form of contraception. She is interested in possible tubal ligation, but also Nexplanon and IUDs. She currently has a regular menstrual cycle, that is not overly heavy. She is concerned about possible hair growth with starting hormonal contraception, as one of her sisters has hormonal issues.    OB History          2    Para   2    Term                AB        Living   2         SAB        IAB        Ectopic        Multiple        Live Births               Obstetric Comments   1st  due to breech- pt age 31  2nd vaginal                      Objective    Vitals:    24 0818   BP: 110/66   BP Location: Left arm   Patient Position: Sitting   Cuff Size: Standard   Weight: 65.6 kg (144 lb 9.6 oz)   Height: 5' 2\" (1.575 m)        Physical Exam  Constitutional:       Appearance: Normal appearance.   HENT:      Head: Normocephalic and atraumatic.   Cardiovascular:      Rate and Rhythm: Normal rate.   Pulmonary:      Effort: Pulmonary effort is normal. No respiratory distress.   Musculoskeletal:         General: Normal range of motion.   Neurological:      Mental Status: She is alert.   Skin:     General: Skin is warm and dry.          Assessment    Patient Active Problem List   Diagnosis    Snoring    Vitamin D deficiency    Family history of breast cancer    Hirsutism    Tenderness of right temporomandibular joint    Encounter for well adult exam with abnormal findings    Mixed hyperlipidemia    Overweight (BMI 25.0-29.9)    Vaginal itching    Immunization refused        Plan   - Reviewed different forms of contraception, including progesterone IUD's, Nexplanon, and tubal sterilization; we reviewed the pros and cons of each, as well as the insertion or surgical process  - Patient " provided information, as well as [email or web address] for more information  - Discussed methods of emergency contraception  - Instructed patient to use abstinence prior to initiating contraception  - Patient will call with which method she desires to use    I have spent a total time of 45 minutes on 03/14/24 in caring for this patient including Risks and benefits of tx options, Instructions for management, Patient and family education, and Counseling / Coordination of care.

## 2024-03-14 NOTE — TELEPHONE ENCOUNTER
Wants provider to know: aunt had ovarian cancer, sister colon cancer, and sister had breast cancer. Would nexplanon be ok. Please contact patient. She is worried that adding hormone birth control can't affect her and the family history of cancer.

## 2024-03-15 DIAGNOSIS — Z80.3 FAMILY HISTORY OF BREAST CANCER: Primary | ICD-10-CM

## 2024-03-21 ENCOUNTER — TELEPHONE (OUTPATIENT)
Dept: HEMATOLOGY ONCOLOGY | Facility: CLINIC | Age: 44
End: 2024-03-21

## 2024-03-21 NOTE — TELEPHONE ENCOUNTER
I called Wyatt to schedule a new patient appointment with the Cancer Risk and Genetics Program.      Outcome:  Genetics appointment scheduled for 10/17/2024 1130AM.     Personal/Family History Related to Appointment:  Fhx Breast ca (Sister)   Fhx colon ca (Sister)   Fhx Uterine ca (Mother) (m. Aunt)  Fhx prostate ca (m. Uncle)   No personal h/o ca       History of Genetic Testing:  Patient reports family history of genetic testing. Sister had test done about 13-14 years ago test came back negative, sister was dx with breast cancer.     Genetics Family History Questionnaire:  [email or web address]

## 2024-09-06 LAB
25(OH)D3+25(OH)D2 SERPL-MCNC: 37 NG/ML (ref 30–100)
ALBUMIN SERPL-MCNC: 4.3 G/DL (ref 3.5–5.7)
ALP SERPL-CCNC: 61 U/L (ref 35–120)
ALT SERPL-CCNC: 17 U/L
ANION GAP SERPL CALCULATED.3IONS-SCNC: 12 MMOL/L (ref 3–11)
AST SERPL-CCNC: 15 U/L
BASOPHILS # BLD AUTO: 0 THOU/CMM (ref 0–0.1)
BASOPHILS NFR BLD AUTO: 0 %
BILIRUB SERPL-MCNC: 0.4 MG/DL (ref 0.2–1)
BUN SERPL-MCNC: 13 MG/DL (ref 7–25)
CALCIUM SERPL-MCNC: 9.1 MG/DL (ref 8.5–10.1)
CHLORIDE SERPL-SCNC: 103 MMOL/L (ref 100–109)
CHOLEST SERPL-MCNC: 151 MG/DL
CHOLEST/HDLC SERPL: 2.6 {RATIO}
CO2 SERPL-SCNC: 27 MMOL/L (ref 21–31)
CREAT SERPL-MCNC: 0.57 MG/DL (ref 0.4–1.1)
CYTOLOGY CMNT CVX/VAG CYTO-IMP: ABNORMAL
DIFFERENTIAL METHOD BLD: ABNORMAL
EOSINOPHIL # BLD AUTO: 0.1 THOU/CMM (ref 0–0.5)
EOSINOPHIL NFR BLD AUTO: 2 %
ERYTHROCYTE [DISTWIDTH] IN BLOOD BY AUTOMATED COUNT: 14.5 % (ref 12–16)
GFR/BSA.PRED SERPLBLD CYS-BASED-ARV: 114 ML/MIN/{1.73_M2}
GLUCOSE SERPL-MCNC: 84 MG/DL (ref 65–99)
HCT VFR BLD AUTO: 39.5 % (ref 35–43)
HDLC SERPL-MCNC: 58 MG/DL (ref 23–92)
HGB BLD-MCNC: 12.9 G/DL (ref 11.5–14.5)
LDLC SERPL CALC-MCNC: 69 MG/DL
LYMPHOCYTES # BLD AUTO: 1.8 THOU/CMM (ref 1–3)
LYMPHOCYTES NFR BLD AUTO: 32 %
MCH RBC QN AUTO: 27.3 PG (ref 26–34)
MCHC RBC AUTO-ENTMCNC: 32.5 G/DL (ref 32–37)
MCV RBC AUTO: 84 FL (ref 80–100)
MONOCYTES # BLD AUTO: 0.4 THOU/CMM (ref 0.3–1)
MONOCYTES NFR BLD AUTO: 7 %
NEUTROPHILS # BLD AUTO: 3.5 THOU/CMM (ref 1.8–7.8)
NEUTROPHILS NFR BLD AUTO: 59 %
NONHDLC SERPL-MCNC: 93 MG/DL
PLATELET # BLD AUTO: 243 THOU/CMM (ref 140–350)
PMV BLD REES-ECKER: 8.6 FL (ref 7.5–11.3)
POTASSIUM SERPL-SCNC: 4.7 MMOL/L (ref 3.5–5.2)
PROT SERPL-MCNC: 6.8 G/DL (ref 6.3–8.3)
RBC # BLD AUTO: 4.71 MILL/CMM (ref 3.7–4.7)
SODIUM SERPL-SCNC: 142 MMOL/L (ref 135–145)
TRIGL SERPL-MCNC: 122 MG/DL
TSH SERPL-ACNC: 3.81 UIU/ML (ref 0.45–5.33)
WBC # BLD AUTO: 5.8 THOU/CMM (ref 4–10)

## 2024-09-13 ENCOUNTER — OFFICE VISIT (OUTPATIENT)
Dept: FAMILY MEDICINE CLINIC | Facility: CLINIC | Age: 44
End: 2024-09-13
Payer: COMMERCIAL

## 2024-09-13 VITALS
DIASTOLIC BLOOD PRESSURE: 80 MMHG | WEIGHT: 145.4 LBS | HEIGHT: 62 IN | TEMPERATURE: 99.1 F | SYSTOLIC BLOOD PRESSURE: 120 MMHG | OXYGEN SATURATION: 100 % | BODY MASS INDEX: 26.76 KG/M2 | HEART RATE: 61 BPM

## 2024-09-13 DIAGNOSIS — Z12.11 COLON CANCER SCREENING: ICD-10-CM

## 2024-09-13 DIAGNOSIS — E78.2 MIXED HYPERLIPIDEMIA: ICD-10-CM

## 2024-09-13 DIAGNOSIS — Z23 NEED FOR TDAP VACCINATION: ICD-10-CM

## 2024-09-13 DIAGNOSIS — Z28.21 IMMUNIZATION REFUSED: ICD-10-CM

## 2024-09-13 DIAGNOSIS — Z00.01 ENCOUNTER FOR WELL ADULT EXAM WITH ABNORMAL FINDINGS: Primary | ICD-10-CM

## 2024-09-13 DIAGNOSIS — Z23 ENCOUNTER FOR IMMUNIZATION: ICD-10-CM

## 2024-09-13 DIAGNOSIS — E55.9 VITAMIN D DEFICIENCY: ICD-10-CM

## 2024-09-13 DIAGNOSIS — E66.3 OVERWEIGHT (BMI 25.0-29.9): ICD-10-CM

## 2024-09-13 PROCEDURE — 99214 OFFICE O/P EST MOD 30 MIN: CPT | Performed by: FAMILY MEDICINE

## 2024-09-13 PROCEDURE — 99396 PREV VISIT EST AGE 40-64: CPT | Performed by: FAMILY MEDICINE

## 2024-09-13 PROCEDURE — 90715 TDAP VACCINE 7 YRS/> IM: CPT

## 2024-09-13 PROCEDURE — 90471 IMMUNIZATION ADMIN: CPT

## 2024-09-13 RX ORDER — ACETAMINOPHEN 160 MG
4000 TABLET,DISINTEGRATING ORAL DAILY
Qty: 180 CAPSULE | Refills: 0 | Status: SHIPPED | OUTPATIENT
Start: 2024-09-13

## 2024-09-13 RX ORDER — ATORVASTATIN CALCIUM 10 MG/1
10 TABLET, FILM COATED ORAL DAILY
Qty: 90 TABLET | Refills: 1 | Status: SHIPPED | OUTPATIENT
Start: 2024-09-13 | End: 2024-09-13 | Stop reason: SDUPTHER

## 2024-09-13 RX ORDER — ATORVASTATIN CALCIUM 10 MG/1
10 TABLET, FILM COATED ORAL DAILY
Qty: 90 TABLET | Refills: 1 | Status: SHIPPED | OUTPATIENT
Start: 2024-09-13

## 2024-09-13 NOTE — PROGRESS NOTES
Adult Annual Physical  Name: Wyatt Carmen      : 1980      MRN: [id number]  Encounter Provider: Jennyfer Garcia MD  Encounter Date: 2024   Encounter department: [facility]    Assessment & Plan  Encounter for well adult exam with abnormal findings  Advice and education were given regarding nutrition, aerobic exercises, weight-bearing exercises, cardiovascular risk reduction, fall risk reduction, and age-appropriate supplements.     The patient was counseled regarding instructions for management, risk factor reductions, prognosis, risks and benefits of treatment options, patient and family education, and importance of compliance with treatment.  Discussed D-Tab with the patient she agreed tolerated well without side effect       Overweight (BMI 25.0-29.9)  Chronic stable BMI 26.2 discussed with patient portion control low-carb low-fat diet increase physical activity       Vitamin D deficiency  Chronic fair control continue vitamin D supplement 4000 IU once a day vitamin D rich diet review  Orders:    Cholecalciferol (Vitamin D3) 50 MCG (2000 UT) capsule; Take 2 capsules (4,000 Units total) by mouth daily    Mixed hyperlipidemia  Chronic asymptomatic well-controlled continue atorvastatin 10 mg once a day  Orders:    Lipid Panel with Direct LDL reflex; Future    atorvastatin (LIPITOR) 10 mg tablet; Take 1 tablet (10 mg total) by mouth daily    Colon cancer screening  Patient candidate for colon cancer screening discussed colonoscopy she agree refer the patient to see GI  Orders:    Ambulatory Referral to Gastroenterology; Future    Immunization refused  Patient refused flu shot for today       Encounter for immunization    Orders:    TDAP VACCINE GREATER THAN OR EQUAL TO 8YO IM    Need for Tdap vaccination    Orders:    TDAP VACCINE GREATER THAN OR EQUAL TO 8YO IM    Immunizations and preventive care screenings were discussed with patient today. Appropriate education was  printed on patient's after visit summary.    Counseling:  Alcohol/drug use: discussed moderation in alcohol intake, the recommendations for healthy alcohol use, and avoidance of illicit drug use.  Dental Health: discussed importance of regular tooth brushing, flossing, and dental visits.  Injury prevention: discussed safety/seat belts, safety helmets, smoke detectors, carbon dioxide detectors, and smoking near bedding or upholstery.  Sexual health: discussed sexually transmitted diseases, partner selection, use of condoms, avoidance of unintended pregnancy, and contraceptive alternatives.  Exercise: the importance of regular exercise/physical activity was discussed. Recommend exercise 3-5 times per week for at least 30 minutes.          History of Present Illness   Patient here for well exam follow-up with a chronic condition compliant with the medication tolerated well without side effect no new concern recent blood work discussed with the patient    Adult Annual Physical:  Patient presents for annual physical.     Diet and Physical Activity:  - Diet/Nutrition:. no special diet  - Exercise: no formal exercise.    Depression Screening:  - PHQ-2 Score: 0    General Health:  - Sleep: sleeps well.  - Hearing: normal hearing bilateral ears.  - Vision: wears glasses.  - Dental: regular dental visits.    /GYN Health:  - Follows with GYN: yes.   - Menopause: premenopausal.   - History of STDs: no    Review of Systems   Constitutional:  Negative for chills and fever.   HENT:  Negative for ear pain and sore throat.    Eyes:  Negative for pain and visual disturbance.   Respiratory:  Negative for cough and shortness of breath.    Cardiovascular:  Negative for chest pain and palpitations.   Gastrointestinal:  Negative for abdominal pain, constipation, diarrhea and vomiting.   Genitourinary:  Negative for dysuria and hematuria.   Musculoskeletal:  Negative for arthralgias and back pain.   Skin:  Negative for color change and  "rash.   Neurological:  Negative for seizures and syncope.   All other systems reviewed and are negative.        Objective     /80 (BP Location: Left arm, Patient Position: Sitting, Cuff Size: Standard)   Pulse 61   Temp 99.1 °F (37.3 °C) (Tympanic)   Ht 5' 2.36\" (1.584 m)   Wt 66 kg (145 lb 6.4 oz)   SpO2 100%   BMI 26.29 kg/m²     Physical Exam  Vitals and nursing note reviewed.   Constitutional:       General: She is not in acute distress.     Appearance: She is well-developed. She is not diaphoretic.   HENT:      Head: Normocephalic.      Right Ear: Tympanic membrane and external ear normal.      Left Ear: Tympanic membrane and external ear normal.      Nose: No rhinorrhea.      Mouth/Throat:      Pharynx: No posterior oropharyngeal erythema.   Eyes:      General:         Right eye: No discharge.         Left eye: No discharge.      Conjunctiva/sclera: Conjunctivae normal.   Neck:      Vascular: No JVD.   Cardiovascular:      Rate and Rhythm: Normal rate and regular rhythm.      Heart sounds: Normal heart sounds. No murmur heard.     No gallop.   Pulmonary:      Effort: Pulmonary effort is normal. No respiratory distress.      Breath sounds: Normal breath sounds. No wheezing.   Abdominal:      General: There is no distension.      Palpations: Abdomen is soft.      Tenderness: There is no abdominal tenderness. There is no rebound.   Musculoskeletal:         General: No tenderness.      Cervical back: Normal range of motion and neck supple.   Lymphadenopathy:      Cervical: No cervical adenopathy.   Skin:     General: Skin is warm.      Findings: No rash.   Neurological:      Mental Status: She is alert and oriented to person, place, and time.         "

## 2024-09-13 NOTE — ASSESSMENT & PLAN NOTE
Advice and education were given regarding nutrition, aerobic exercises, weight-bearing exercises, cardiovascular risk reduction, fall risk reduction, and age-appropriate supplements.     The patient was counseled regarding instructions for management, risk factor reductions, prognosis, risks and benefits of treatment options, patient and family education, and importance of compliance with treatment.  Discussed D-Tab with the patient she agreed tolerated well without side effect

## 2024-09-13 NOTE — ASSESSMENT & PLAN NOTE
Patient candidate for colon cancer screening discussed colonoscopy she agree refer the patient to see GI  Orders:    Ambulatory Referral to Gastroenterology; Future

## 2024-09-13 NOTE — ASSESSMENT & PLAN NOTE
Chronic asymptomatic well-controlled continue atorvastatin 10 mg once a day  Orders:    Lipid Panel with Direct LDL reflex; Future    atorvastatin (LIPITOR) 10 mg tablet; Take 1 tablet (10 mg total) by mouth daily

## 2024-09-13 NOTE — ASSESSMENT & PLAN NOTE
Chronic fair control continue vitamin D supplement 4000 IU once a day vitamin D rich diet review  Orders:    Cholecalciferol (Vitamin D3) 50 MCG (2000 UT) capsule; Take 2 capsules (4,000 Units total) by mouth daily

## 2024-09-13 NOTE — ASSESSMENT & PLAN NOTE
Chronic stable BMI 26.2 discussed with patient portion control low-carb low-fat diet increase physical activity

## 2024-10-13 PROBLEM — Z12.11 COLON CANCER SCREENING: Status: RESOLVED | Noted: 2024-09-13 | Resolved: 2024-10-13

## 2024-11-14 ENCOUNTER — TELEPHONE (OUTPATIENT)
Dept: GASTROENTEROLOGY | Facility: CLINIC | Age: 44
End: 2024-11-14

## 2024-11-14 NOTE — LETTER
November 21, 2024     Wyatt Carmen    Patient: Wyatt Carmen   YOB: 1980   Date of Visit: 11/14/2024       Dear Dr. Carmen:    Thank you for referring Wyatt Carmen to me for evaluation. Below are my notes for this consultation.    If you have questions, please do not hesitate to call me. I look forward to following your patient along with you.         Sincerely,        Anyi Greg        CC: No Recipients    Anyi Greg  11/14/2024  1:56 PM  Signed  Called and left a message for the patient to call back to schedule colonoscopy screening from referral        Medicine Instructions for Adults with Diabetes who Need a Bowel Prep       Follow these instructions when a BOWEL PREP is required for your procedure or surgery!    NOTE:   GLP-1 Agonists taken weekly: do not take in the 7 days before your procedure   SGLT-2 Inhibitors: do not take in the 4 days before your procedure     On the Day Before Surgery/Procedure  If you are having a procedure (e.g. Colonoscopy) or surgery that requires a bowel prep and you may have at least a clear liquid diet, follow the directions below based on the type of medicine you take for your diabetes.     Type of Medicine You Take Examples What to do   Pre-Mixed Insulin - Intermediate Acting Humalog® 75/25, Humulin® 70/30, Novolog® 70/30, Regular Insulin Take ½ your regular dose the evening before your procedure   Rapid/Fast Acting Insulin Humalog® U200, NovoLog®, Apidra®, Fiasp® Take ½ your regular dose the evening before your procedure.   Long-Acting Insulin Lantus®, Levemir®, Tresiba®, Toujeo®, Basaglar® Take your FULL regular dose the day before procedure   Oral Sulfonylurea Glipizide/Glimepiride/Glucotrol® Take ½ your regular dose the evening before your procedure   Other Oral Diabetes Medicines Metformin®, Glucophage®, Glucophage XR®, Riomet®, Glumetza®), Actose®, Avandia®, Glyset®, Prandin® Take your regular dose with dinner in the evening before  your procedure   GLP-1 Agonists AdlyxinÒ, ByettaÒ, BydureonÒ, OzempicÒ, SoliquaÒ, TanzeumÒ, TrulicityÒ, VictozaÒ, Saxenda®, Rybelsus® If taken daily, take as normal    If taken weekly, do not take this medicine for 7 days before your procedure including the day of the procedure (resume taking after the procedure)   SGLT-2 Inhibitors Jardiance®, Invokana®, Farxiga®,   Steglatro®, Brenzavvy®, Qtern®, Segluromet®, Glyxambi®, Synjardy®, Synjardy XR®, Invokamet®, Invokamet XR®, Trijary XR®, Xigduo XR®, Steglujan® Do not take for 4 days before your procedure including the day of the procedure (resume taking after the procedure)                More information continued on back                    Medicine Instructions for Adults with Diabetes who Need a Bowel Prep  Page 2      On the Day of Surgery/Procedure  Follow the directions below based on the type of medicine you take for your diabetes.     Type of Medicine You Take Examples What to do   Long-Acting Insulin Lantus®, Levemir®, Tresiba®, Toujeo®, Basaglar®, Semglee®   If you usually take your Long-Acting Insulin in the morning, take the full dose as scheduled.   GLP-1 Agonists AdlyxinÒ, ByettaÒ, BydureonÒ, OzempicÒ, SoliquaÒ, TanzeumÒ, TrulicityÒ, VictozaÒ, Saxenda®, Rybelsus® Do NOT take this medicine on the day of your procedure (resume taking after the procedure)       On the Day of Surgery/Procedure (continued)  Except for the morning Long-Acting Insulin, DO NOT take ANY diabetic medicine on the day of your procedure unless you were instructed by the doctor who manages your diabetes medicines.    Continue to check your blood sugars.  If you have an insulin pump, ask your endocrinologist for instructions at least 3 days before your procedure. NOTE: If you are not able to ask your endocrinologist in advance, on the day of the procedure set your insulin pump to your basal rate only. Bring your insulin pump supplies to the hospital.     If you have any questions about  taking your diabetes medicines prior to your procedure, please contact the doctor who manages your diabetes medicines.         COLONOSCOPY  MIRALAX/Dulcolax Bowel Preparation Instructions    The OR/GI Lab will contact you the evening prior to your procedure with your exact arrival time.    Our practice requires a 1 week notice for any cancellations or rescheduling. We kindly ask that you immediately notify us of any changes including any new medications that are prescribed. Thank you for your cooperation.     WEEK BEFORE YOUR PROCEDURE:  Stop taking Iron tablets.  5 days prior, AVOID vegetables and fruits with skins or seeds, nuts, corn, popcorn and whole grain breads.   Purchase: One (1) 238-gram container of Miralax (polyethylene glycol 3350), four (4) 5 mg Dulcolax (bisacodyl) tablets, and one (1) 64-ounce bottle of Gatorade (sports drink) - no red, orange, or purple. These may be purchased at any pharmacy without a prescription. Generic products are permissible.   Arrange responsible transportation for day of the procedure.     DAY BEFORE THE PROCEDURE:   CLEAR liquids only for entire day prior. Nothing red, orange or purple.    You MAY have:                                                               Soda  Water  Broth Gatorade  Jello  Popsicles Coffee/tea without milk/creamer     YOU MAY NOT HAVE:  Solid foods   Milk and milk products    Juice with pulp    BOWEL PREPARATION:  Includes: One (1) 238-gram container of Miralax (polyethylene glycol 3350), four (4) 5 mg Dulcolax (bisacodyl) tablets, and one (1) 64-ounce bottle of Gatorade (sports drink).  Preparation may be refrigerated.  Entire bowel prep should be completed.     Afternoon before the procedure (2:00 pm - 5:00 pm):    Take two (2) 5 mg Dulcolax laxative tablets.     Evening before the procedure (6:00 pm):  Mix entire container of Miralax with one (1) 64-ounce bottle of Gatorade and shake until all medication is dissolved.   Begin drinking  solution. Drink an eight (8) ounce cup every 10-15 minutes until you have consumed half (32 ounces) of the solution.  Refrigerate remaining solution.    Night before the procedure (8:00 pm):  Take two (2) 5 mg Dulcolax laxative tablets.     Beginning 5 hours before your procedure:  Drink the remaining amount of prepared solution (32 ounces).  Drink an eight (8) ounce cup every 10-15 minutes until you have consumed the remaining solution.     Bowel prep should be completed 4 hours prior to procedure time.    NOTHING TO EAT OR DRINK AFTER MIDNIGHT- EXCEPT FOR YOUR PREP    DAY OF THE PROCEDURE:  You may brush your teeth.  Leave all jewelry at home.  Please arrive for your procedure as indicated by the OR / GI Lab / Endoscopy Unit. The hospital will contact you the day before with your exact arrival time.   Make sure you have arranged ahead of time for a responsible adult (18 or older) to accompany and drive you home after the procedure.  Please discuss any transportation concerns with our staff prior to your procedure.    The effects of the anesthesia can persist for 24 hours.  After receiving the sedation, you must exercise caution before engaging in any activity that could harm yourself and others (such as driving a car).  Do not make any important decisions or do not drink any alcoholic beverages during this time period.  After your procedure, you may have anything you'd like to eat or drink.  You will probably want to start with something light.  Please include plenty of fluids.  Avoid items that cause gas such as sodas and salads.    SPECIAL INSTRUCTIONS:    For patients currently taking blood thinners and/or antiplatelet therapy our office will contact the prescribing provider.  Our office will contact you with any required changes to your medication regimen.     Blood thinner (i.e. - Coumadin, Pradaxa, Lovenox, Xarelto, Eliquis)  ?  Continue (Do Not Stop)  ? Stop______________for_____________days prior to the  procedure.    Antiplatelet (i.e. - Plavix, Aggrenox, Effient, Brilinta)  ?  Continue (Do Not Stop)  ? Stop______________for_____________days prior to the procedure.       Diabetes:   If you are Diabetic, please see separate Diabetic Instruction Sheet.          Prescribed medications:  Do not stop your aspirin, or any of your other medications (unless instructed otherwise).    Take the rest of your prescribed medications with small sips of water at least 2 hours prior to your procedure.      For any questions or concerns related to your bowel preparation or pre-procedure instructions, please contact our office at [phone].  Thank you for choosing [facility]!

## 2024-11-14 NOTE — TELEPHONE ENCOUNTER
Called and left a message for the patient to call back to schedule colonoscopy screening from referral

## 2024-11-21 NOTE — TELEPHONE ENCOUNTER
11/21/24  Screened by: Vaishali Paul    Referring Provider     Pre- Screening:     There is no height or weight on file to calculate BMI.  Has patient been referred for a routine screening Colonoscopy? yes  Is the patient between 45-75 years old? yes      Previous Colonoscopy no   If yes:    Date:     Facility:     Reason:       Does the patient want to see a Gastroenterologist prior to their procedure OR are they having any GI symptoms? no    Has the patient been hospitalized or had abdominal surgery in the past 6 months? no    Does the patient use supplemental oxygen? no    Does the patient take Coumadin, Lovenox, Plavix, Elliquis, Xarelto, or other blood thinning medication? no    Has the patient had a stroke, cardiac event, or stent placed in the past year? no      If patient is between 45yrs - 49yrs, please advise patient that we will have to confirm benefits & coverage with their insurance company for a routine screening colonoscopy.

## 2024-11-21 NOTE — TELEPHONE ENCOUNTER
Scheduled date of colonoscopy (as of today): 12/20/24  Physician performing colonoscopy: FRIDA  Location of colonoscopy: [facility]  Bowel prep reviewed with patient: GORDO / LINA  Instructions reviewed with patient by: ELIZ  Clearances: N/A

## 2024-12-05 ENCOUNTER — ANESTHESIA EVENT (OUTPATIENT)
Dept: ANESTHESIOLOGY | Facility: HOSPITAL | Age: 44
End: 2024-12-05

## 2024-12-05 ENCOUNTER — ANESTHESIA (OUTPATIENT)
Dept: ANESTHESIOLOGY | Facility: HOSPITAL | Age: 44
End: 2024-12-05

## 2025-01-21 ENCOUNTER — TELEPHONE (OUTPATIENT)
Dept: GASTROENTEROLOGY | Facility: CLINIC | Age: 45
End: 2025-01-21

## 2025-01-21 NOTE — TELEPHONE ENCOUNTER
Called to reschedule colonoscopy - pt states she is not able to schedule at this time - she will call us when her schedule is available.

## 2025-03-10 ENCOUNTER — TELEPHONE (OUTPATIENT)
Age: 45
End: 2025-03-10

## 2025-03-10 LAB
CHOLEST SERPL-MCNC: 151 MG/DL
CHOLEST/HDLC SERPL: 3.3 {RATIO}
HDLC SERPL-MCNC: 46 MG/DL (ref 23–92)
LDLC SERPL CALC-MCNC: 79 MG/DL
NONHDLC SERPL-MCNC: 105 MG/DL
TRIGL SERPL-MCNC: 129 MG/DL

## 2025-03-10 NOTE — TELEPHONE ENCOUNTER
Patient request her lab orders sent to [facility] @[phone] and asked for a phone call when it is done.

## 2025-03-12 ENCOUNTER — RESULTS FOLLOW-UP (OUTPATIENT)
Dept: FAMILY MEDICINE CLINIC | Facility: CLINIC | Age: 45
End: 2025-03-12

## 2025-03-12 NOTE — TELEPHONE ENCOUNTER
----- Message from Jennyfer Garcia MD sent at 3/12/2025  3:41 PM EDT -----  Negative mammogram repeat in 1 year send the report to care gap

## 2025-03-13 ENCOUNTER — TELEPHONE (OUTPATIENT)
Dept: ADMINISTRATIVE | Facility: OTHER | Age: 45
End: 2025-03-13

## 2025-03-13 NOTE — TELEPHONE ENCOUNTER
Upon review of the In Basket request we were able to locate, review, and update the patient chart as requested for Mammogram.    Any additional questions or concerns should be emailed to the Practice Liaisons via the appropriate education email address, please do not reply via In Basket.    Thank you  Sunita Norton MA   [facility]

## 2025-03-13 NOTE — TELEPHONE ENCOUNTER
----- Message from Rosanne SHERWOOD sent at 3/12/2025  4:01 PM EDT -----  Regarding: care gap request  03/12/25 4:01 PM    Hello, our patient attached above has had Mammogram completed/performed. Please assist in updating the patient chart by pulling the Care Everywhere (CE) document. The date of service is 3/10/25.     Thank you,  Rosanne Ochao [facility]  [facility]

## 2025-03-14 ENCOUNTER — TELEMEDICINE (OUTPATIENT)
Dept: FAMILY MEDICINE CLINIC | Facility: CLINIC | Age: 45
End: 2025-03-14
Payer: COMMERCIAL

## 2025-03-14 DIAGNOSIS — E78.2 MIXED HYPERLIPIDEMIA: Primary | ICD-10-CM

## 2025-03-14 DIAGNOSIS — E55.9 VITAMIN D DEFICIENCY: ICD-10-CM

## 2025-03-14 DIAGNOSIS — E66.3 OVERWEIGHT (BMI 25.0-29.9): ICD-10-CM

## 2025-03-14 PROCEDURE — 98001 SYNCH AUDIO-VIDEO NEW LOW 30: CPT | Performed by: FAMILY MEDICINE

## 2025-03-14 NOTE — ASSESSMENT & PLAN NOTE
Chronic asymptomatic fair control continue with vitamin D 2000 IU once a day vitamin D rich diet discussed

## 2025-03-14 NOTE — ASSESSMENT & PLAN NOTE
Chronic asymptomatic fair control continue with atorvastatin 10 mg once a day low-fat diet review

## 2025-03-14 NOTE — PROGRESS NOTES
Virtual Regular VisitName: Wyatt Carmen      : 1980      MRN: [id number]  Encounter Provider: Jennyfer Garcia MD  Encounter Date: 3/14/2025   Encounter department: [facility]  :  Assessment & Plan  Mixed hyperlipidemia  Chronic asymptomatic fair control continue with atorvastatin 10 mg once a day low-fat diet review       Vitamin D deficiency  Chronic asymptomatic fair control continue with vitamin D 2000 IU once a day vitamin D rich diet discussed       Overweight (BMI 25.0-29.9)  Chronic encourage patient to continue with portion control low-carb low-fat diet           History of Present Illness     Patient virtual visit follow-up with a chronic condition and no new concern recent blood work discussed with the patient      Review of Systems   Constitutional:  Negative for chills and fever.   HENT:  Negative for ear pain and sore throat.    Eyes:  Negative for pain and visual disturbance.   Respiratory:  Negative for cough and shortness of breath.    Cardiovascular:  Negative for chest pain and palpitations.   Gastrointestinal:  Negative for abdominal pain, constipation, diarrhea and vomiting.   Genitourinary:  Negative for dysuria and hematuria.   Skin:  Negative for color change and rash.   Neurological:  Negative for seizures and syncope.   All other systems reviewed and are negative.      Objective   There were no vitals taken for this visit.    Physical Exam  Constitutional:       General: She is not in acute distress.     Appearance: She is well-developed. She is not ill-appearing, toxic-appearing or diaphoretic.   HENT:      Head: Normocephalic.      Nose: Nose normal. No congestion or rhinorrhea.      Mouth/Throat:      Mouth: Mucous membranes are moist.      Pharynx: No oropharyngeal exudate or posterior oropharyngeal erythema.   Eyes:      General:         Right eye: No discharge.         Left eye: No discharge.      Conjunctiva/sclera: Conjunctivae normal.    Neck:      Vascular: No JVD.      Comments: No grossly visible mass   Pulmonary:      Effort: Pulmonary effort is normal. No respiratory distress.      Breath sounds: No stridor. No wheezing.   Abdominal:      General: There is no distension.      Comments: Patient state her abdomen and a soft she also state no tender  Patient deny any visible or palpable bulging to suggest hernia  I was able to visualize abdomen and there is no change in the color no distension no visible mass   Musculoskeletal:         General: Normal range of motion.      Cervical back: Normal range of motion and neck supple.   Skin:     Findings: No erythema or rash.   Neurological:      Mental Status: She is alert and oriented to person, place, and time.   Psychiatric:         Mood and Affect: Mood normal.         Administrative Statements   Encounter provider Jennyfer Garcia MD    The Patient is located at Home and in the following state in which I hold an active license [place].    The patient was identified by name and date of birth. Wyatt Carmen was informed that this is a telemedicine visit and that the visit is being conducted through the Epic Embedded platform. She agrees to proceed..  My office door was closed. No one else was in the room.  She acknowledged consent and understanding of privacy and security of the video platform. The patient has agreed to participate and understands they can discontinue the visit at any time.    I have spent a total time of 15 minutes in caring for this patient on the day of the visit/encounter including Diagnostic results, Risks and benefits of tx options, Instructions for management, Importance of tx compliance, and Impressions, not including the time spent for establishing the audio/video connection.

## 2025-07-24 DIAGNOSIS — E78.2 MIXED HYPERLIPIDEMIA: ICD-10-CM

## 2025-07-25 RX ORDER — ATORVASTATIN CALCIUM 10 MG/1
10 TABLET, FILM COATED ORAL DAILY
Qty: 90 TABLET | Refills: 1 | Status: SHIPPED | OUTPATIENT
Start: 2025-07-25